# Patient Record
Sex: MALE | Race: OTHER | HISPANIC OR LATINO
[De-identification: names, ages, dates, MRNs, and addresses within clinical notes are randomized per-mention and may not be internally consistent; named-entity substitution may affect disease eponyms.]

---

## 2021-03-02 PROBLEM — Z00.00 ENCOUNTER FOR PREVENTIVE HEALTH EXAMINATION: Status: ACTIVE | Noted: 2021-03-02

## 2021-03-10 ENCOUNTER — APPOINTMENT (OUTPATIENT)
Dept: PULMONOLOGY | Facility: CLINIC | Age: 66
End: 2021-03-10
Payer: MEDICARE

## 2021-03-10 ENCOUNTER — RESULT REVIEW (OUTPATIENT)
Age: 66
End: 2021-03-10

## 2021-03-10 VITALS
TEMPERATURE: 96.9 F | WEIGHT: 225 LBS | HEART RATE: 90 BPM | SYSTOLIC BLOOD PRESSURE: 142 MMHG | DIASTOLIC BLOOD PRESSURE: 70 MMHG | BODY MASS INDEX: 34.1 KG/M2 | OXYGEN SATURATION: 96 % | HEIGHT: 68 IN

## 2021-03-10 DIAGNOSIS — Z78.9 OTHER SPECIFIED HEALTH STATUS: ICD-10-CM

## 2021-03-10 DIAGNOSIS — Z86.79 PERSONAL HISTORY OF OTHER DISEASES OF THE CIRCULATORY SYSTEM: ICD-10-CM

## 2021-03-10 DIAGNOSIS — Z87.891 PERSONAL HISTORY OF NICOTINE DEPENDENCE: ICD-10-CM

## 2021-03-10 DIAGNOSIS — Z87.438 PERSONAL HISTORY OF OTHER DISEASES OF MALE GENITAL ORGANS: ICD-10-CM

## 2021-03-10 PROCEDURE — 99204 OFFICE O/P NEW MOD 45 MIN: CPT

## 2021-03-10 PROCEDURE — 99072 ADDL SUPL MATRL&STAF TM PHE: CPT

## 2021-03-10 RX ORDER — METOPROLOL TARTRATE 25 MG/1
25 TABLET, FILM COATED ORAL
Refills: 0 | Status: ACTIVE | COMMUNITY

## 2021-03-10 NOTE — REVIEW OF SYSTEMS
[Recent Wt Gain (___ Lbs)] : ~T recent [unfilled] lb weight gain [Dyspnea] : dyspnea [Seasonal Allergies] : seasonal allergies [Obesity] : obesity [Negative] : Psychiatric

## 2021-03-10 NOTE — PHYSICAL EXAM
[No Acute Distress] : no acute distress [Normal Oropharynx] : normal oropharynx [II] : Mallampati Class: II [Normal Appearance] : normal appearance [No Neck Mass] : no neck mass [Normal Rate/Rhythm] : normal rate/rhythm [Normal S1, S2] : normal s1, s2 [No Murmurs] : no murmurs [No Resp Distress] : no resp distress [No Acc Muscle Use] : no acc muscle use [Clear to Auscultation Bilaterally] : clear to auscultation bilaterally [Benign] : benign [Normal Gait] : normal gait [No Clubbing] : no clubbing [No Cyanosis] : no cyanosis [No Edema] : no edema [No Focal Deficits] : no focal deficits [Oriented x3] : oriented x3 [Normal Affect] : normal affect

## 2021-03-10 NOTE — HISTORY OF PRESENT ILLNESS
[TextBox_4] : 66 year old male with Hx of Covid in April last year came for evaluation of dyspnea.\par He was hospitalized for 6 weeks on high flow o2. Then he went to rehab on home O2.\par He was on home O2 for 1 more month.\par Dyspnea is better but still present when going up stairs or uphill.\par No cough, no hemoptysis.\par he had a PFT's last year, OK as per patient.\par he also had a HST but does not know the results.\par he does not use any inhalers.\par PMHX: BPH, HTN\par SHX: ex smoker 1995\par FHX: pulmonary fibrosis\par \par Has mild seasonal allergies- takes Singulair\par Has gained 15 lbs since last year\par \par Labs 2021: Eos normal, Co2 27- mild increase\par

## 2021-04-21 ENCOUNTER — RESULT REVIEW (OUTPATIENT)
Age: 66
End: 2021-04-21

## 2021-04-22 ENCOUNTER — RESULT REVIEW (OUTPATIENT)
Age: 66
End: 2021-04-22

## 2021-04-26 ENCOUNTER — APPOINTMENT (OUTPATIENT)
Dept: PULMONOLOGY | Facility: CLINIC | Age: 66
End: 2021-04-26
Payer: MEDICARE

## 2021-04-26 VITALS
TEMPERATURE: 97 F | BODY MASS INDEX: 34.1 KG/M2 | OXYGEN SATURATION: 96 % | DIASTOLIC BLOOD PRESSURE: 72 MMHG | WEIGHT: 225 LBS | HEIGHT: 68 IN | SYSTOLIC BLOOD PRESSURE: 138 MMHG | HEART RATE: 70 BPM

## 2021-04-26 DIAGNOSIS — R91.8 OTHER NONSPECIFIC ABNORMAL FINDING OF LUNG FIELD: ICD-10-CM

## 2021-04-26 DIAGNOSIS — G47.33 OBSTRUCTIVE SLEEP APNEA (ADULT) (PEDIATRIC): ICD-10-CM

## 2021-04-26 DIAGNOSIS — R06.00 DYSPNEA, UNSPECIFIED: ICD-10-CM

## 2021-04-26 PROCEDURE — 99214 OFFICE O/P EST MOD 30 MIN: CPT

## 2021-04-26 PROCEDURE — 99072 ADDL SUPL MATRL&STAF TM PHE: CPT

## 2021-04-26 NOTE — HISTORY OF PRESENT ILLNESS
[TextBox_4] : 66 year old female with Hx of Covid infection last year, DOM, came for f/u.\par She had a CT chest recently, my interpretation: small areas of fibrosis and bronchiectasis RtUL and RtML. Few nodules, largest 5.6 mm in the Rt UL\par Echo: mild LVH with increased Lt atrium\par PFT's: mild decrease DLCO, no obstruction\par \par He feels the same with dyspnea when he climbs stairs.\par Has HTN.\par Sleep study showing mild DOM.\par Has mild nocturia at night. He snores at night.\par Does not have daytime somnolence, sleeps well.\par \par SHX: does not smoke\par

## 2021-07-26 ENCOUNTER — APPOINTMENT (OUTPATIENT)
Dept: PULMONOLOGY | Facility: CLINIC | Age: 66
End: 2021-07-26

## 2024-11-08 NOTE — REVIEW OF SYSTEMS
Plan:    1) Internal Medicine to follow and manage  2) following pulse, Blood Pressure, Respirations, and Temperature.    [Recent Wt Gain (___ Lbs)] : ~T recent [unfilled] lb weight gain [Dyspnea] : dyspnea [Seasonal Allergies] : seasonal allergies [Nocturia] : nocturia [Obesity] : obesity [Negative] : Psychiatric

## 2025-08-01 ENCOUNTER — APPOINTMENT (OUTPATIENT)
Dept: PULMONOLOGY | Facility: CLINIC | Age: 70
End: 2025-08-01
Payer: MEDICARE

## 2025-08-01 VITALS
WEIGHT: 225 LBS | HEART RATE: 54 BPM | OXYGEN SATURATION: 94 % | HEIGHT: 68 IN | SYSTOLIC BLOOD PRESSURE: 134 MMHG | TEMPERATURE: 98 F | DIASTOLIC BLOOD PRESSURE: 60 MMHG | BODY MASS INDEX: 34.1 KG/M2

## 2025-08-01 DIAGNOSIS — G47.33 OBSTRUCTIVE SLEEP APNEA (ADULT) (PEDIATRIC): ICD-10-CM

## 2025-08-01 DIAGNOSIS — J84.10 PULMONARY FIBROSIS, UNSPECIFIED: ICD-10-CM

## 2025-08-01 DIAGNOSIS — R91.8 OTHER NONSPECIFIC ABNORMAL FINDING OF LUNG FIELD: ICD-10-CM

## 2025-08-01 PROCEDURE — 99204 OFFICE O/P NEW MOD 45 MIN: CPT | Mod: 25

## 2025-08-01 PROCEDURE — 94060 EVALUATION OF WHEEZING: CPT

## 2025-08-01 PROCEDURE — 94729 DIFFUSING CAPACITY: CPT

## 2025-08-01 PROCEDURE — 94727 GAS DIL/WSHOT DETER LNG VOL: CPT

## 2025-08-01 RX ORDER — ASPIRIN 325 MG/1
TABLET, FILM COATED ORAL
Refills: 0 | Status: ACTIVE | COMMUNITY

## 2025-08-01 RX ORDER — TAMSULOSIN HCL 0.4 MG
0.4 CAPSULE ORAL
Refills: 0 | Status: ACTIVE | COMMUNITY